# Patient Record
Sex: FEMALE | Race: BLACK OR AFRICAN AMERICAN | NOT HISPANIC OR LATINO | Employment: FULL TIME | URBAN - METROPOLITAN AREA
[De-identification: names, ages, dates, MRNs, and addresses within clinical notes are randomized per-mention and may not be internally consistent; named-entity substitution may affect disease eponyms.]

---

## 2022-10-13 ENCOUNTER — TELEPHONE (OUTPATIENT)
Dept: OBGYN CLINIC | Facility: CLINIC | Age: 32
End: 2022-10-13

## 2022-10-13 NOTE — TELEPHONE ENCOUNTER
10/13/22 patient has an upcoming NP appointment 11/7/22 with Santa Pack, pt will faxed her MR to us from [facility] at [facility] in [employer] [facility] before her appt   date